# Patient Record
Sex: FEMALE | Race: WHITE | NOT HISPANIC OR LATINO | Employment: FULL TIME | URBAN - NONMETROPOLITAN AREA
[De-identification: names, ages, dates, MRNs, and addresses within clinical notes are randomized per-mention and may not be internally consistent; named-entity substitution may affect disease eponyms.]

---

## 2017-01-10 PROCEDURE — G0123 SCREEN CERV/VAG THIN LAYER: HCPCS | Performed by: OBSTETRICS & GYNECOLOGY

## 2017-01-11 ENCOUNTER — LAB REQUISITION (OUTPATIENT)
Dept: LAB | Facility: HOSPITAL | Age: 42
End: 2017-01-11

## 2017-01-11 DIAGNOSIS — Z12.72 ENCOUNTER FOR SCREENING FOR MALIGNANT NEOPLASM OF VAGINA: ICD-10-CM

## 2017-01-11 LAB
GEN CATEG CVX/VAG CYTO-IMP: NORMAL
LAB AP CASE REPORT: NORMAL
LAB AP GYN ADDITIONAL INFORMATION: NORMAL
LAB AP GYN OTHER FINDINGS: NORMAL
Lab: NORMAL
PATH INTERP SPEC-IMP: NORMAL
STAT OF ADQ CVX/VAG CYTO-IMP: NORMAL

## 2020-02-26 PROCEDURE — G0123 SCREEN CERV/VAG THIN LAYER: HCPCS | Performed by: OBSTETRICS & GYNECOLOGY

## 2020-02-27 ENCOUNTER — LAB REQUISITION (OUTPATIENT)
Dept: LAB | Facility: HOSPITAL | Age: 45
End: 2020-02-27

## 2020-02-27 DIAGNOSIS — Z12.72 ENCOUNTER FOR SCREENING FOR MALIGNANT NEOPLASM OF VAGINA: ICD-10-CM

## 2020-02-27 LAB
GEN CATEG CVX/VAG CYTO-IMP: NORMAL
LAB AP CASE REPORT: NORMAL
LAB AP GYN ADDITIONAL INFORMATION: NORMAL
PATH INTERP SPEC-IMP: NORMAL
STAT OF ADQ CVX/VAG CYTO-IMP: NORMAL

## 2020-09-21 ENCOUNTER — OFFICE VISIT (OUTPATIENT)
Dept: FAMILY MEDICINE CLINIC | Facility: CLINIC | Age: 45
End: 2020-09-21

## 2020-09-21 VITALS
SYSTOLIC BLOOD PRESSURE: 118 MMHG | WEIGHT: 170 LBS | RESPIRATION RATE: 16 BRPM | HEART RATE: 74 BPM | DIASTOLIC BLOOD PRESSURE: 72 MMHG | HEIGHT: 66 IN | OXYGEN SATURATION: 99 % | BODY MASS INDEX: 27.32 KG/M2 | TEMPERATURE: 97.8 F

## 2020-09-21 DIAGNOSIS — L23.7 ALLERGIC CONTACT DERMATITIS DUE TO PLANTS, EXCEPT FOOD: Primary | ICD-10-CM

## 2020-09-21 PROCEDURE — 96372 THER/PROPH/DIAG INJ SC/IM: CPT | Performed by: NURSE PRACTITIONER

## 2020-09-21 PROCEDURE — 99202 OFFICE O/P NEW SF 15 MIN: CPT | Performed by: NURSE PRACTITIONER

## 2020-09-21 RX ORDER — METHYLPREDNISOLONE 4 MG/1
TABLET ORAL
Qty: 1 EACH | Refills: 0 | Status: SHIPPED | OUTPATIENT
Start: 2020-09-21 | End: 2021-09-02

## 2020-09-21 RX ORDER — DEXAMETHASONE SODIUM PHOSPHATE 4 MG/ML
4 INJECTION, SOLUTION INTRA-ARTICULAR; INTRALESIONAL; INTRAMUSCULAR; INTRAVENOUS; SOFT TISSUE ONCE
Status: COMPLETED | OUTPATIENT
Start: 2020-09-21 | End: 2020-09-21

## 2020-09-21 RX ORDER — DIPHENHYDRAMINE HCL 25 MG
50 CAPSULE ORAL EVERY 4 HOURS PRN
COMMUNITY
End: 2021-09-02

## 2020-09-21 RX ORDER — TRIAMCINOLONE ACETONIDE 1 MG/G
CREAM TOPICAL 2 TIMES DAILY
Qty: 80 G | Refills: 0 | Status: SHIPPED | OUTPATIENT
Start: 2020-09-21 | End: 2021-09-02

## 2020-09-21 RX ADMIN — DEXAMETHASONE SODIUM PHOSPHATE 4 MG: 4 INJECTION, SOLUTION INTRA-ARTICULAR; INTRALESIONAL; INTRAMUSCULAR; INTRAVENOUS; SOFT TISSUE at 14:49

## 2020-09-21 NOTE — PROGRESS NOTES
Subjective   Chief Complaint:  Poison ivy-reestablish care    History of Present Illness:  This 45 y.o. female was seen in the office today.  She is reestablishing care today, past medical and surgical history taken.    Rash  This is a new problem. The current episode started in the past 7 days. The problem is unchanged. The affected locations include the neck, left upper leg and right upper leg. Rash characteristics: Itching. She was exposed to plant contact. Pertinent negatives include no congestion, cough, facial edema, fatigue, fever or shortness of breath. Past treatments include moisturizer. The treatment provided no relief.     No Known Allergies   Current Outpatient Medications on File Prior to Visit   Medication Sig   • diphenhydrAMINE (BENADRYL) 25 mg capsule Take 50 mg by mouth Every 4 (Four) Hours As Needed for Itching.   • [DISCONTINUED] acyclovir (ZOVIRAX) 5 % ointment APPLY FIVE TIMES A DAY GENERIC FOR ZOVIRAX     No current facility-administered medications on file prior to visit.       Past Medical, Surgical, Social, and Family History:  Past Medical History:   Diagnosis Date   • Cholelithiasis 2015    s/p GB surgery     Past Surgical History:   Procedure Laterality Date   • CHOLECYSTECTOMY  2015   • HYSTERECTOMY  2015     Social History     Socioeconomic History   • Marital status:      Spouse name: Not on file   • Number of children: Not on file   • Years of education: Not on file   • Highest education level: Not on file   Tobacco Use   • Smoking status: Never Smoker   • Smokeless tobacco: Former User   Substance and Sexual Activity   • Alcohol use: Never     Frequency: Never     Family History   Problem Relation Age of Onset   • Hypertension Mother    • Diabetes Father    • No Known Problems Sister      Review of Systems   Constitutional: Negative for appetite change, chills, fatigue and fever.   HENT: Negative for congestion.    Respiratory: Negative for cough and shortness of breath.   "  Cardiovascular: Negative for chest pain and palpitations.   Musculoskeletal: Negative for arthralgias and myalgias.   Skin: Positive for rash.     Objective   Physical Exam  Vitals signs and nursing note reviewed.   Constitutional:       General: She is not in acute distress.     Appearance: She is not diaphoretic.   HENT:      Head: Normocephalic and atraumatic.      Nose: Nose normal.   Eyes:      Conjunctiva/sclera: Conjunctivae normal.      Pupils: Pupils are equal, round, and reactive to light.   Neck:      Musculoskeletal: Normal range of motion and neck supple.      Thyroid: No thyromegaly.      Vascular: No JVD.      Trachea: No tracheal deviation.   Cardiovascular:      Rate and Rhythm: Normal rate and regular rhythm.      Heart sounds: Normal heart sounds. No murmur. No friction rub. No gallop.    Pulmonary:      Effort: Pulmonary effort is normal. No respiratory distress.      Breath sounds: Normal breath sounds. No wheezing.   Abdominal:      General: Bowel sounds are normal.      Palpations: Abdomen is soft.      Tenderness: There is no abdominal tenderness. There is no guarding.   Musculoskeletal: Normal range of motion.         General: No tenderness or deformity.   Lymphadenopathy:      Cervical: No cervical adenopathy.   Skin:     General: Skin is warm and dry.      Capillary Refill: Capillary refill takes less than 2 seconds.      Findings: Rash (neck - both lower legs) present.   Neurological:      Mental Status: She is alert and oriented to person, place, and time.   Psychiatric:         Behavior: Behavior normal.         Thought Content: Thought content normal.         Judgment: Judgment normal.     /72   Pulse 74   Temp 97.8 °F (36.6 °C) (Temporal)   Resp 16   Ht 167.6 cm (66\")   Wt 77.1 kg (170 lb)   SpO2 99%   BMI 27.44 kg/m²     Assessment/Plan   Diagnoses and all orders for this visit:    1. Allergic contact dermatitis due to plants, except food (Primary)  -     dexamethasone " (DECADRON) injection 4 mg  -     methylPREDNISolone (MEDROL) 4 MG dose pack; Take as directed on package instructions.  Dispense: 1 each; Refill: 0  -     triamcinolone (KENALOG) 0.1 % cream; Apply  topically to the appropriate area as directed 2 (Two) Times a Day.  Dispense: 80 g; Refill: 0    Discussion:  Advised and educated plan of care.  Decadron given in office today.  She will start a Dosepak tomorrow, topical steroids for itching relief.  Offered preventative care labs including lipid monitoring.  She declined today.    Follow-up:  Return if symptoms worsen or fail to improve.    Note e-Signed by TERESA Sin on 09/21/2020 at 14:34 CDT

## 2021-08-12 ENCOUNTER — TELEPHONE (OUTPATIENT)
Dept: FAMILY MEDICINE CLINIC | Facility: CLINIC | Age: 46
End: 2021-08-12

## 2021-08-12 RX ORDER — FLUCONAZOLE 150 MG/1
150 TABLET ORAL ONCE
Qty: 1 TABLET | Refills: 0 | Status: SHIPPED | OUTPATIENT
Start: 2021-08-12 | End: 2021-08-12

## 2021-08-12 NOTE — TELEPHONE ENCOUNTER
Caller: Krystal Farias    Relationship to patient: Self    Best call back number: [phone]     Patient is needing: PATIENTS GYNECOLOGIST CLOSED DOWN. PATIENT HAS YEAST INFECTION AND IS WANTING TO KNOW IF  WOULD CALL SOMETHING IN FOR IT.     CONFIRMED PHARMACY: [facility] - [place], [place] - [address] - [phone]  - [phone]   [phone]

## 2021-08-12 NOTE — TELEPHONE ENCOUNTER
Dr lopez..she does have new gyn but new appt is not until next month and they tory not call in meds

## 2021-09-02 ENCOUNTER — OFFICE VISIT (OUTPATIENT)
Dept: FAMILY MEDICINE CLINIC | Facility: CLINIC | Age: 46
End: 2021-09-02

## 2021-09-02 VITALS
HEIGHT: 66 IN | OXYGEN SATURATION: 96 % | SYSTOLIC BLOOD PRESSURE: 120 MMHG | BODY MASS INDEX: 25.55 KG/M2 | RESPIRATION RATE: 16 BRPM | WEIGHT: 159 LBS | DIASTOLIC BLOOD PRESSURE: 78 MMHG | HEART RATE: 71 BPM

## 2021-09-02 DIAGNOSIS — J01.91 ACUTE RECURRENT SINUSITIS, UNSPECIFIED LOCATION: ICD-10-CM

## 2021-09-02 DIAGNOSIS — J40 BRONCHITIS: Primary | ICD-10-CM

## 2021-09-02 DIAGNOSIS — Z20.822 SUSPECTED COVID-19 VIRUS INFECTION: ICD-10-CM

## 2021-09-02 PROCEDURE — 99213 OFFICE O/P EST LOW 20 MIN: CPT | Performed by: NURSE PRACTITIONER

## 2021-09-02 RX ORDER — LEVOFLOXACIN 500 MG/1
500 TABLET, FILM COATED ORAL DAILY
Qty: 10 TABLET | Refills: 0 | Status: SHIPPED | OUTPATIENT
Start: 2021-09-02 | End: 2021-09-12

## 2021-09-02 RX ORDER — TRIAMCINOLONE ACETONIDE 55 UG/1
2 SPRAY, METERED NASAL DAILY
Qty: 16.5 G | Refills: 1 | Status: SHIPPED | OUTPATIENT
Start: 2021-09-02 | End: 2022-04-16

## 2021-09-02 NOTE — PROGRESS NOTES
"Subjective   Chief Complaint:  Persistent cough and sinus problems    History of Present Illness:  This 46 y.o. female was seen in the office today.  She reports x2 weeks of cough congestion sinus problems.  Reports has been persistent despite using a round of amoxicillin given for dental issues.    No Known Allergies   No current outpatient medications on file prior to visit.     No current facility-administered medications on file prior to visit.      Past Medical, Surgical, Social, and Family History:  Past Medical History:   Diagnosis Date   • Cholelithiasis 2015    s/p GB surgery     Past Surgical History:   Procedure Laterality Date   • CHOLECYSTECTOMY  2015   • HYSTERECTOMY  2015     Social History     Socioeconomic History   • Marital status:      Spouse name: Not on file   • Number of children: Not on file   • Years of education: Not on file   • Highest education level: Not on file   Tobacco Use   • Smoking status: Never Smoker   • Smokeless tobacco: Former User   Substance and Sexual Activity   • Alcohol use: Never     Family History   Problem Relation Age of Onset   • Hypertension Mother    • Diabetes Father    • No Known Problems Sister      Objective   Physical Exam  Vitals reviewed.   Constitutional:       General: She is not in acute distress.     Appearance: Normal appearance.   HENT:      Nose: Congestion and rhinorrhea present.      Right Sinus: Maxillary sinus tenderness and frontal sinus tenderness present.      Left Sinus: Maxillary sinus tenderness and frontal sinus tenderness present.   Cardiovascular:      Rate and Rhythm: Normal rate and regular rhythm.   Pulmonary:      Effort: Pulmonary effort is normal. No respiratory distress.      Breath sounds: Rhonchi present. No wheezing.   Neurological:      Mental Status: She is alert.     /78   Pulse 71   Resp 16   Ht 167.6 cm (66\")   Wt 72.1 kg (159 lb)   SpO2 96%   BMI 25.66 kg/m²     Assessment/Plan   Diagnoses and all orders " for this visit:    1. Bronchitis (Primary)    2. Suspected COVID-19 virus infection    3. Acute recurrent sinusitis, unspecified location  -     COVID-19,[facility],NP/OP Swab in Transport Media or ESwab 72 HR TAT - Swab, Oropharynx  -     Triamcinolone Acetonide (Nasacort Allergy 24HR) 55 MCG/ACT nasal inhaler; 2 sprays into the nostril(s) as directed by provider Daily.  Dispense: 16.5 g; Refill: 1    Other orders  -     levoFLOXacin (Levaquin) 500 MG tablet; Take 1 tablet by mouth Daily for 10 days.  Dispense: 10 tablet; Refill: 0    Discussion:  Advised and educated plan of care.  We will get a confirmatory Covid test-I did verify with her she did get to negative rapid test.  We will add a nasal steroid, Levaquin to cover recurrent sinusitis along with the bronchitis or even pneumonia coverage if it moves further.  This is a step up from prior treatment.  Will let her know the results when available.  Advised to continue Mucinex.    Follow-up:  Return for As Needed - Depending on Test Results - Will Call.    Electronically signed by TERESA Sin, 09/02/21, 2:18 PM CDT.

## 2021-09-03 ENCOUNTER — TELEPHONE (OUTPATIENT)
Dept: FAMILY MEDICINE CLINIC | Facility: CLINIC | Age: 46
End: 2021-09-03

## 2021-09-03 NOTE — TELEPHONE ENCOUNTER
Krystal called back and said that she is having pain across the center of her back and her chest feels tight.   Please advise

## 2021-09-03 NOTE — TELEPHONE ENCOUNTER
"I notified the patient of Julio's instructions to go to the ER for evaluation.  She hesitated and asked if she could just get a cxr, I informed her that yes, we could do a cxr order, but if it showed pneumonia she was already on the strongest \"at home\" abx for pneumonia.  I told her that she would have to go to the ER anyway for possible iv abx.  She then asked if she could \"wait it out\" and I told her that it was up to her as to what she chose to do, but going to the ER, she could potentially get a chest CT and/or treatment right then instead of waiting.    I again urged her to go to the ER at  and she said that she would speak to her  and see what he wanted to do  "

## 2021-09-03 NOTE — TELEPHONE ENCOUNTER
Caller: Krystal Farias    Relationship: Self    Best call back number: [phone]    Who are you requesting to speak with (clinical staff, provider,  specific staff member): CLINICAL STAFF    What was the call regarding: PATIENT IS EXPERIENCING MID BACK PAIN AND WOULD LIKE TO DISCUSS THIS NEW SYMPTOM WITH CLINICAL STAFF

## 2021-09-04 ENCOUNTER — NURSE TRIAGE (OUTPATIENT)
Dept: CALL CENTER | Facility: HOSPITAL | Age: 46
End: 2021-09-04

## 2021-09-04 LAB
Lab: NORMAL
SARS-COV-2 RNA RESP QL NAA+PROBE: NOT DETECTED

## 2021-09-04 NOTE — TELEPHONE ENCOUNTER
"    Reason for Disposition  • General information question, no triage required and triager able to answer question    Additional Information  • Negative: [1] Caller is not with the adult (patient) AND [2] reporting urgent symptoms  • Negative: Lab result questions  • Negative: Medication questions  • Negative: Caller can't be reached by phone  • Negative: Caller has already spoken to PCP or another triager  • Negative: RN needs further essential information from caller in order to complete triage  • Negative: Requesting regular office appointment  • Negative: [1] Caller requesting NON-URGENT health information AND [2] PCP's office is the best resource  • Negative: Health Information question, no triage required and triager able to answer question    Answer Assessment - Initial Assessment Questions  1. REASON FOR CALL or QUESTION: \"What is your reason for calling today?\" or \"How can I best help you?\" or \"What question do you have that I can help answer?\"      Caller is requesting information on signing up for [facility].  Information given.    Protocols used: INFORMATION ONLY CALL - NO TRIAGE-ADULT-      "

## 2021-09-07 NOTE — PROGRESS NOTES
Please call the patient - Covid is negative    Electronically signed by TERESA Sin, 09/07/21, 7:57 AM CDT.

## 2021-09-07 NOTE — PROGRESS NOTES
Aside from hand washing and distancing, can have her change her toothbrush on the last day of ABT.  Otherwise sinus infections and bronchitis can occur.  Glad she is better.  Advise it is normal with bronchitis to continue to cough up mucous for a while and to not take any cough suppressants.  Better to get it up and out.  OTC mucinex can certainly be of help too.    Electronically signed by TERESA Sin, 09/07/21, 2:23 PM CDT.

## 2021-12-22 ENCOUNTER — TELEPHONE (OUTPATIENT)
Dept: FAMILY MEDICINE CLINIC | Facility: CLINIC | Age: 46
End: 2021-12-22

## 2021-12-22 NOTE — TELEPHONE ENCOUNTER
Caller: Krystal Farias    Relationship: Self    Best call back number: [phone]     What medication are you requesting: SOMETHING FOR POSSIBLE YEAST INFECTION    What are your current symptoms: SLIGHT ITCHINESS/IRRITATION    How long have you been experiencing symptoms: COUPLE OF DAYS     Have you had these symptoms before:    [x] Yes  [] No    Have you been treated for these symptoms before:   [x] Yes  [] No    If a prescription is needed, what is your preferred pharmacy and phone number: [facility] - [place], [place] - [address] [phone] [facility] [phone]      Additional notes: PATIENT STATED THAT SHE HAS HAD BEFORE AND WAS RECENTLY IN A HOT TUB. PLEASE ADVISE

## 2021-12-23 RX ORDER — FLUCONAZOLE 100 MG/1
100 TABLET ORAL DAILY
Qty: 3 TABLET | Refills: 0 | Status: SHIPPED | OUTPATIENT
Start: 2021-12-23 | End: 2021-12-26

## 2022-01-04 ENCOUNTER — OFFICE VISIT (OUTPATIENT)
Dept: FAMILY MEDICINE CLINIC | Facility: CLINIC | Age: 47
End: 2022-01-04

## 2022-01-04 VITALS
TEMPERATURE: 98.5 F | BODY MASS INDEX: 25.55 KG/M2 | HEART RATE: 66 BPM | WEIGHT: 159 LBS | HEIGHT: 66 IN | RESPIRATION RATE: 16 BRPM | OXYGEN SATURATION: 96 %

## 2022-01-04 DIAGNOSIS — J02.9 SORE THROAT: Primary | ICD-10-CM

## 2022-01-04 DIAGNOSIS — J02.0 STREP PHARYNGITIS: ICD-10-CM

## 2022-01-04 LAB
EXPIRATION DATE: ABNORMAL
INTERNAL CONTROL: ABNORMAL
Lab: ABNORMAL
S PYO AG THROAT QL: POSITIVE

## 2022-01-04 PROCEDURE — 99213 OFFICE O/P EST LOW 20 MIN: CPT | Performed by: NURSE PRACTITIONER

## 2022-01-04 PROCEDURE — 87880 STREP A ASSAY W/OPTIC: CPT | Performed by: NURSE PRACTITIONER

## 2022-01-04 RX ORDER — AZITHROMYCIN 250 MG/1
TABLET, FILM COATED ORAL
Qty: 6 TABLET | Refills: 0 | Status: SHIPPED | OUTPATIENT
Start: 2022-01-04 | End: 2022-01-09

## 2022-01-04 NOTE — PROGRESS NOTES
"Subjective   Chief Complaint:  Evaluation of Covid Symptoms    History of Present Illness:  This 46 y.o. female was seen in the office today in the drive through.  Reports has been experiencing sore throat, hoarseness, congestion, bilateral earache with an acute onset starting Saturday.    Denies having any cough, fatigue, loss of taste or smell.    Denies recent exposure to active Covid-19 cases.          Covid-19 Vaccination Status:  Reports unvaccinated to Covid or influenza    No Known Allergies   Current Outpatient Medications on File Prior to Visit   Medication Sig   • Triamcinolone Acetonide (Nasacort Allergy 24HR) 55 MCG/ACT nasal inhaler 2 sprays into the nostril(s) as directed by provider Daily.     No current facility-administered medications on file prior to visit.      Past Medical, Surgical, Social, and Family History:  Past Medical History:   Diagnosis Date   • Cholelithiasis 2015    s/p GB surgery     Past Surgical History:   Procedure Laterality Date   • CHOLECYSTECTOMY  2015   • HYSTERECTOMY  2015     Social History     Socioeconomic History   • Marital status:    Tobacco Use   • Smoking status: Never Smoker   • Smokeless tobacco: Former User   Substance and Sexual Activity   • Alcohol use: Never     Family History   Problem Relation Age of Onset   • Hypertension Mother    • Diabetes Father    • No Known Problems Sister      Objective   Covid Precautions Maintained  Physical Exam  Constitutional:       General: She is not in acute distress.     Appearance: Normal appearance. She is not ill-appearing.   HENT:      Mouth/Throat:      Pharynx: Oropharyngeal exudate and posterior oropharyngeal erythema present. No uvula swelling.   Pulmonary:      Effort: Pulmonary effort is normal. No respiratory distress.   Neurological:      Mental Status: She is alert.     Pulse 66   Temp 98.5 °F (36.9 °C)   Resp 16   Ht 167.6 cm (66\")   Wt 72.1 kg (159 lb)   SpO2 96%   BMI 25.66 kg/m² "     Assessment/Plan   Diagnoses and all orders for this visit:    1. Sore throat (Primary)  -     POC Rapid Strep A    2. Strep pharyngitis    Other orders  -     azithromycin (Zithromax Z-Sukhwinder) 250 MG tablet; Take 2 tablets the first day, then 1 tablet daily for 4 days.  Dispense: 6 tablet; Refill: 0    Discussion:  Advised and educated plan of care.  Point-of-care strep test positive, antibiotics to follow.    Follow-up:  Return if symptoms worsen or fail to improve.    Electronically signed by TERESA Sin, 01/04/22, 3:35 PM CST.

## 2022-01-09 ENCOUNTER — TELEPHONE (OUTPATIENT)
Dept: FAMILY MEDICINE CLINIC | Facility: CLINIC | Age: 47
End: 2022-01-09

## 2022-01-09 NOTE — TELEPHONE ENCOUNTER
ON CALL 1.6.22  Day 6 of z pack  Still has sore throat; no sob, no dysphagia    Told to salt water gargle/ER if worsens  At least no change till Monday/early next week when office opens

## 2022-04-18 ENCOUNTER — OFFICE VISIT (OUTPATIENT)
Dept: FAMILY MEDICINE CLINIC | Facility: CLINIC | Age: 47
End: 2022-04-18

## 2022-04-18 VITALS
WEIGHT: 181 LBS | BODY MASS INDEX: 28.41 KG/M2 | HEIGHT: 67 IN | TEMPERATURE: 97.3 F | HEART RATE: 84 BPM | DIASTOLIC BLOOD PRESSURE: 88 MMHG | SYSTOLIC BLOOD PRESSURE: 146 MMHG | RESPIRATION RATE: 16 BRPM | OXYGEN SATURATION: 96 %

## 2022-04-18 DIAGNOSIS — J02.0 STREP PHARYNGITIS: Primary | ICD-10-CM

## 2022-04-18 DIAGNOSIS — J02.9 SORE THROAT: ICD-10-CM

## 2022-04-18 LAB
EXPIRATION DATE: ABNORMAL
INTERNAL CONTROL: ABNORMAL
Lab: ABNORMAL
S PYO AG THROAT QL: POSITIVE

## 2022-04-18 PROCEDURE — 96373 THER/PROPH/DIAG INJ IA: CPT | Performed by: NURSE PRACTITIONER

## 2022-04-18 PROCEDURE — 87880 STREP A ASSAY W/OPTIC: CPT | Performed by: NURSE PRACTITIONER

## 2022-04-18 PROCEDURE — 99213 OFFICE O/P EST LOW 20 MIN: CPT | Performed by: NURSE PRACTITIONER

## 2022-04-18 RX ORDER — DEXAMETHASONE SODIUM PHOSPHATE 4 MG/ML
10 INJECTION, SOLUTION INTRA-ARTICULAR; INTRALESIONAL; INTRAMUSCULAR; INTRAVENOUS; SOFT TISSUE ONCE
Status: COMPLETED | OUTPATIENT
Start: 2022-04-18 | End: 2022-04-18

## 2022-04-18 RX ORDER — CEFTRIAXONE 1 G/1
1 INJECTION, POWDER, FOR SOLUTION INTRAMUSCULAR; INTRAVENOUS ONCE
Status: COMPLETED | OUTPATIENT
Start: 2022-04-18 | End: 2022-04-18

## 2022-04-18 RX ADMIN — DEXAMETHASONE SODIUM PHOSPHATE 10 MG: 4 INJECTION, SOLUTION INTRA-ARTICULAR; INTRALESIONAL; INTRAMUSCULAR; INTRAVENOUS; SOFT TISSUE at 13:22

## 2022-04-18 RX ADMIN — CEFTRIAXONE 1 G: 1 INJECTION, POWDER, FOR SOLUTION INTRAMUSCULAR; INTRAVENOUS at 11:30

## 2022-04-18 NOTE — PROGRESS NOTES
Subjective   Chief Complaint:  Reevaluation of sore throat    History of Present Illness:  This 46 y.o. female was seen in the office today.  She was seen in urgent care and is on day 2 of a Z-Sukhwinder.  Reports throat still hurting.  Reports still having fevers.  Reports taking ibuprofen is much is 2400 mg/day taj doses.    Allergies   Allergen Reactions   • Penicillins Unknown - Low Severity     Not sure if allergic to PCN, Mother reported from childhood.      Current Outpatient Medications on File Prior to Visit   Medication Sig   • cetirizine (zyrTEC) 10 MG tablet Take 10 mg by mouth Daily.   • azithromycin (Zithromax Z-Sukhwinder) 250 MG tablet Take 2 tablets the first day, then 1 tablet daily for 4 days.     No current facility-administered medications on file prior to visit.      Past Medical, Surgical, Social, and Family History:  Past Medical History:   Diagnosis Date   • Cholelithiasis 2015    s/p GB surgery     Past Surgical History:   Procedure Laterality Date   • CHOLECYSTECTOMY  2015   • HYSTERECTOMY  2015     Social History     Socioeconomic History   • Marital status:    Tobacco Use   • Smoking status: Never Smoker   • Smokeless tobacco: Former User   Substance and Sexual Activity   • Alcohol use: Never   • Drug use: Never     Family History   Problem Relation Age of Onset   • Hypertension Mother    • Diabetes Father    • No Known Problems Sister      Objective   Physical Exam  Vitals reviewed.   Constitutional:       General: She is not in acute distress.     Appearance: Normal appearance.   HENT:      Mouth/Throat:      Pharynx: Oropharyngeal exudate and posterior oropharyngeal erythema present.      Tonsils: Tonsillar exudate present. 3+ on the right. 3+ on the left.   Cardiovascular:      Rate and Rhythm: Normal rate and regular rhythm.   Pulmonary:      Effort: Pulmonary effort is normal.      Breath sounds: Normal breath sounds.     /88 (BP Location: Left arm)   Pulse 84   Temp 97.3 °F  "(36.3 °C)   Resp 16   Ht 170.2 cm (67\")   Wt 82.1 kg (181 lb)   SpO2 96%   BMI 28.35 kg/m²     Assessment/Plan   Diagnoses and all orders for this visit:    1. Strep pharyngitis (Primary)  -     cefTRIAXone (ROCEPHIN) injection 1 g  -     dexamethasone (DECADRON) injection 10 mg    Discussion:  Advised and educated plan of care.  With allergies to penicillins-she is on track with the [facility] to finish this-will give booster antibiotic and steroid injection today.  Advised salt water gargles.    Follow-up:  No follow-ups on file.    Electronically signed by TERESA Sin, 04/18/22, 11:26 AM CDT.  "

## 2022-04-19 ENCOUNTER — TELEPHONE (OUTPATIENT)
Dept: FAMILY MEDICINE CLINIC | Facility: CLINIC | Age: 47
End: 2022-04-19

## 2022-04-19 NOTE — LETTER
TERESA Armijo  [address] [address]  Phone: [phone]  Fax: [phone]      PATIENT NAME: Krystal Farias                                                                          YOB: [birth year]    To whom it may concern,    Krystal Farias should be excused from work 4/18/2022 - 4/20/2022.    Electronically signed by TERESA Sin, 04/19/22, 1:19 PM CDT.

## 2022-04-19 NOTE — TELEPHONE ENCOUNTER
I have generated a note - this is ok to be off.  Her work shouldn't be asking for diagnosis.  This is PHI and will not be included in the note.  She has the positive result on [facility] if they push her to provide this and it is her prerogative to tell them but again, they shouldn't be asking this.      Electronically signed by TERESA Sin, 04/19/22, 1:17 PM CDT.

## 2022-04-19 NOTE — TELEPHONE ENCOUNTER
Caller: Krystal Farias    Relationship: Self    Best call back number: [phone]        What was the call regarding: PATIENT STATES SHE WAS SEEN IN THE OFFICE YESTERDAY AND TESTED POSITIVE FOR STREP THROAT. SHE STATES HER THROAT IS STILL HURTING AND WANTING TO KNOW IF THE OFFICE CAN FAX OVER A WORK EXCUSE FOR 4/18/22-4/20/22. SHE STATES THE EXCUSE NEEDS TO SAY SHE TESTED POSITIVE FOR STREP. PLEASE ADVISE    FAX TO     [facility]  FAX # [phone]    Do you require a callback: YES

## 2022-04-20 ENCOUNTER — TELEPHONE (OUTPATIENT)
Dept: FAMILY MEDICINE CLINIC | Facility: CLINIC | Age: 47
End: 2022-04-20

## 2022-04-20 RX ORDER — CEFDINIR 300 MG/1
300 CAPSULE ORAL 2 TIMES DAILY
Qty: 14 CAPSULE | Refills: 0 | Status: SHIPPED | OUTPATIENT
Start: 2022-04-20 | End: 2022-04-27

## 2022-04-20 NOTE — TELEPHONE ENCOUNTER
I would suggest a 7 day course of omnicef to finish this off.  The z-pack doesn't always irradicate strep as well as it used to.   Irradication rates for cephalosporins seem superior in most studies.  If needs an extra day or 2 ok, but also since had z-pack and improving, ok to go back.    Electronically signed by TERESA Sin, 04/20/22, 2:58 PM CDT.

## 2022-04-20 NOTE — TELEPHONE ENCOUNTER
Caller: Krystal Farias    Relationship: Self    Best call back number: [phone]    What is the best time to reach you: ANYTIME     Who are you requesting to speak with (clinical staff, provider,  specific staff member): CLINICAL     What was the call regarding: NEEDING TO KNOW IF SHE NEEDS TO RETURN TO WORK WITH STILL HAVING A SORE THROAT AND THE SWOLLEN LYMPH NOTE ON THE RIGHT SIDE    Do you require a callback: YES

## 2022-04-25 ENCOUNTER — TELEPHONE (OUTPATIENT)
Dept: FAMILY MEDICINE CLINIC | Facility: CLINIC | Age: 47
End: 2022-04-25

## 2022-04-25 RX ORDER — FLUCONAZOLE 150 MG/1
150 TABLET ORAL ONCE
Qty: 1 TABLET | Refills: 0 | Status: SHIPPED | OUTPATIENT
Start: 2022-04-25 | End: 2022-04-25

## 2022-04-25 NOTE — TELEPHONE ENCOUNTER
Caller: Krystal Farias    Relationship: Self    Best call back number: [phone]        What was the call regarding: PATIENT STATES A FEW ANTIBIOTICS HAVE RECENTLY BEEN GIVEN TO TREAT STREP THROAT. PATIENT STATES SHE IS NOW ITCHING IN HER GENITALS. PATIENT WOULD LIKE TO KNOW IF SOMETHING CAN BE CALLED IN TO TREAT THE ITCHING. PLEASE ADVISE    Do you require a callback: YES

## 2022-06-30 ENCOUNTER — TELEPHONE (OUTPATIENT)
Dept: FAMILY MEDICINE CLINIC | Facility: CLINIC | Age: 47
End: 2022-06-30

## 2022-06-30 DIAGNOSIS — G89.29 CHRONIC HEEL PAIN, UNSPECIFIED LATERALITY: Primary | ICD-10-CM

## 2022-06-30 DIAGNOSIS — M79.673 CHRONIC HEEL PAIN, UNSPECIFIED LATERALITY: Primary | ICD-10-CM

## 2022-06-30 NOTE — TELEPHONE ENCOUNTER
Caller: Krystal Farias    Relationship: Self    Best call back number: [phone]    What is the medical concern/diagnosis: HEEL PAIN, SWOLLEN ANKLE    What specialty or service is being requested: REFERRAL    What is the provider, practice or medical service name: DR. WORLEY    What is the office location:  [address] [place] [address]    What is the office phone number:  [phone]    Any additional details: PATIENT STATES SHE CAN HARDLY WALK ON HER FOOT DUE TO EXTREME HEEL PAIN/SWOLLEN ANKLE AND SHE IS REQUESTING A REFERRAL TO .

## 2023-02-02 ENCOUNTER — OFFICE VISIT (OUTPATIENT)
Dept: FAMILY MEDICINE CLINIC | Facility: CLINIC | Age: 48
End: 2023-02-02
Payer: COMMERCIAL

## 2023-02-02 VITALS
OXYGEN SATURATION: 97 % | DIASTOLIC BLOOD PRESSURE: 76 MMHG | SYSTOLIC BLOOD PRESSURE: 140 MMHG | WEIGHT: 179 LBS | HEIGHT: 67 IN | BODY MASS INDEX: 28.09 KG/M2 | HEART RATE: 74 BPM

## 2023-02-02 DIAGNOSIS — J02.9 SORE THROAT: ICD-10-CM

## 2023-02-02 DIAGNOSIS — J03.90 TONSILLITIS: Primary | ICD-10-CM

## 2023-02-02 DIAGNOSIS — R05.9 COUGH, UNSPECIFIED TYPE: ICD-10-CM

## 2023-02-02 LAB
EXPIRATION DATE: NORMAL
EXPIRATION DATE: NORMAL
INTERNAL CONTROL: NORMAL
INTERNAL CONTROL: NORMAL
Lab: NORMAL
Lab: NORMAL
S PYO AG THROAT QL: NEGATIVE
SARS-COV-2 AG UPPER RESP QL IA.RAPID: NOT DETECTED

## 2023-02-02 PROCEDURE — 99213 OFFICE O/P EST LOW 20 MIN: CPT | Performed by: FAMILY MEDICINE

## 2023-02-02 PROCEDURE — 87426 SARSCOV CORONAVIRUS AG IA: CPT | Performed by: FAMILY MEDICINE

## 2023-02-02 PROCEDURE — 87880 STREP A ASSAY W/OPTIC: CPT | Performed by: FAMILY MEDICINE

## 2023-02-02 RX ORDER — CLINDAMYCIN HYDROCHLORIDE 150 MG/1
150 CAPSULE ORAL 3 TIMES DAILY
Qty: 21 CAPSULE | Refills: 0 | Status: SHIPPED | OUTPATIENT
Start: 2023-02-02

## 2023-02-02 NOTE — PROGRESS NOTES
"Subjective   Krystal Farias is a 47 y.o. female.     Chief Complaint   Patient presents with   • Sore Throat   • Cough        History of Present Illness     she notes sinus congestion with pressure and sore throat [facility] csough prod green sputusm--denies fver or myalgis      Current Outpatient Medications:   •  cetirizine (zyrTEC) 10 MG tablet, Take 10 mg by mouth Daily., Disp: , Rfl:   •  azithromycin (Zithromax Z-Sukhwinder) 250 MG tablet, Take 2 tablets the first day, then 1 tablet daily for 4 days., Disp: 6 tablet, Rfl: 0  •  clindamycin (Cleocin) 150 MG capsule, Take 1 capsule by mouth 3 (Three) Times a Day., Disp: 21 capsule, Rfl: 0  Allergies   Allergen Reactions   • Penicillins Unknown - Low Severity     Not sure if allergic to PCN, Mother reported from childhood.       BMI is >= 25 and <30. (Overweight) The following options were offered after discussion;: nutrition counseling/recommendations      Past Medical History:   Diagnosis Date   • Cholelithiasis 2015    s/p GB surgery     Past Surgical History:   Procedure Laterality Date   • CHOLECYSTECTOMY  2015   • HYSTERECTOMY  2015       Review of Systems   Constitutional: Negative.    HENT: Positive for congestion, rhinorrhea, sinus pain and sore throat.    Eyes: Negative.    Respiratory: Positive for cough.    Cardiovascular: Negative.    Gastrointestinal: Negative.    Endocrine: Negative.    Genitourinary: Negative.    Musculoskeletal: Negative.    Skin: Negative.    Allergic/Immunologic: Negative.    Neurological: Negative.    Hematological: Negative.    Psychiatric/Behavioral: Negative.        Objective  /76   Pulse 74   Ht 170.2 cm (67\")   Wt 81.2 kg (179 lb)   SpO2 97%   BMI 28.04 kg/m²   Physical Exam  Vitals and nursing note reviewed.   Constitutional:       Appearance: Normal appearance. She is normal weight.   HENT:      Head: Normocephalic and atraumatic.      Nose: Congestion present.      Mouth/Throat:      Mouth: Mucous membranes are dry.     "  Pharynx: Oropharynx is clear. Posterior oropharyngeal erythema present.   Eyes:      Pupils: Pupils are equal, round, and reactive to light.   Cardiovascular:      Rate and Rhythm: Normal rate and regular rhythm.      Pulses: Normal pulses.      Heart sounds: Normal heart sounds.   Pulmonary:      Effort: Pulmonary effort is normal.      Breath sounds: Normal breath sounds.   Abdominal:      General: Abdomen is flat. Bowel sounds are normal.      Palpations: Abdomen is soft.   Musculoskeletal:         General: Normal range of motion.      Cervical back: Normal range of motion and neck supple.   Skin:     General: Skin is warm and dry.      Capillary Refill: Capillary refill takes less than 2 seconds.   Neurological:      General: No focal deficit present.      Mental Status: She is alert and oriented to person, place, and time. Mental status is at baseline.   Psychiatric:         Mood and Affect: Mood normal.         Assessment & Plan   Diagnoses and all orders for this visit:    1. Tonsillitis (Primary)  -     POCT VERITOR SARS-CoV-2 Antigen  -     POCT rapid strep A    2. Sore throat  -     POCT VERITOR SARS-CoV-2 Antigen  -     POCT rapid strep A    3. Cough, unspecified type  -     POCT VERITOR SARS-CoV-2 Antigen  -     POCT rapid strep A    Other orders  -     clindamycin (Cleocin) 150 MG capsule; Take 1 capsule by mouth 3 (Three) Times a Day.  Dispense: 21 capsule; Refill: 0      Keep me informed           Orders Placed This Encounter   Procedures   • POCT VERITOR SARS-CoV-2 Antigen     Order Specific Question:   Release to patient     Answer:   Routine Release   • POCT rapid strep A     Order Specific Question:   Release to patient     Answer:   Routine Release       Follow up:prn

## 2023-09-20 ENCOUNTER — TELEPHONE (OUTPATIENT)
Dept: FAMILY MEDICINE CLINIC | Facility: CLINIC | Age: 48
End: 2023-09-20

## 2023-09-20 RX ORDER — METHYLPREDNISOLONE 4 MG/1
TABLET ORAL
Qty: 21 TABLET | Refills: 0 | Status: SHIPPED | OUTPATIENT
Start: 2023-09-20

## 2023-09-20 NOTE — TELEPHONE ENCOUNTER
Caller: Krystal Farias    Relationship: Self    Best call back number:  [phone]     What medication are you requesting: STEROID PACK    What are your current symptoms: WENT TO DR. THOMAS, CHIROPRACTOR IN [facility], HE SUGGESTED THAT PATIENT GETS A STEROID PACK FOR HER SCIATIC NERVE.  IT IS IN FLAMED.  TOO CALM DOWN, MAY HELP?     How long have you been experiencing symptoms: 30 DAYS    Have you had these symptoms before:    [x] Yes  [] No    Have you been treated for these symptoms before:   [] Yes  [x] No    If a prescription is needed, what is your preferred pharmacy and phone number:      Additional notes:

## 2023-10-26 ENCOUNTER — OFFICE VISIT (OUTPATIENT)
Dept: FAMILY MEDICINE CLINIC | Facility: CLINIC | Age: 48
End: 2023-10-26
Payer: COMMERCIAL

## 2023-10-26 VITALS
DIASTOLIC BLOOD PRESSURE: 74 MMHG | HEART RATE: 74 BPM | SYSTOLIC BLOOD PRESSURE: 132 MMHG | WEIGHT: 184 LBS | HEIGHT: 67 IN | OXYGEN SATURATION: 98 % | BODY MASS INDEX: 28.88 KG/M2

## 2023-10-26 DIAGNOSIS — M54.41 CHRONIC MIDLINE LOW BACK PAIN WITH RIGHT-SIDED SCIATICA: Primary | ICD-10-CM

## 2023-10-26 DIAGNOSIS — G89.29 CHRONIC MIDLINE LOW BACK PAIN WITH RIGHT-SIDED SCIATICA: Primary | ICD-10-CM

## 2023-10-26 PROCEDURE — 99213 OFFICE O/P EST LOW 20 MIN: CPT | Performed by: FAMILY MEDICINE

## 2023-10-26 RX ORDER — MELOXICAM 15 MG/1
15 TABLET ORAL DAILY
COMMUNITY
Start: 2023-07-13

## 2023-10-26 NOTE — PROGRESS NOTES
"Subjective   Krystal Farias is a 48 y.o. female.     Chief Complaint   Patient presents with    Back Pain     Low back         Back Pain         She has been going to chiropactor dr aguirre for 2mos for lower back pain--she notes having lower back down the right leg--she has tried steroids and chiropactic care without improvement  and nsaids       Current Outpatient Medications:     cetirizine (zyrTEC) 10 MG tablet, Take 1 tablet by mouth Daily., Disp: , Rfl:     meloxicam (MOBIC) 15 MG tablet, Take 1 tablet by mouth Daily., Disp: , Rfl:   Allergies   Allergen Reactions    Penicillins Unknown - Low Severity     Not sure if allergic to PCN, Mother reported from childhood.              Facility age limit for growth %sharon is 20 years.    Past Medical History:   Diagnosis Date    Cholelithiasis 2015    s/p GB surgery     Past Surgical History:   Procedure Laterality Date    CHOLECYSTECTOMY  2015    HYSTERECTOMY  2015       Review of Systems   Constitutional: Negative.    HENT: Negative.     Eyes: Negative.    Respiratory: Negative.     Cardiovascular: Negative.    Gastrointestinal: Negative.    Endocrine: Negative.    Genitourinary: Negative.    Musculoskeletal:  Positive for back pain.   Skin: Negative.    Allergic/Immunologic: Negative.    Neurological: Negative.    Hematological: Negative.    Psychiatric/Behavioral: Negative.         Objective /74   Pulse 74   Ht 170.2 cm (67\")   Wt 83.5 kg (184 lb)   SpO2 98%   BMI 28.82 kg/m²    Physical Exam  Vitals and nursing note reviewed.   Constitutional:       Appearance: Normal appearance. She is normal weight.   HENT:      Head: Normocephalic and atraumatic.      Nose: Nose normal.      Mouth/Throat:      Mouth: Mucous membranes are moist.   Eyes:      Pupils: Pupils are equal, round, and reactive to light.   Cardiovascular:      Rate and Rhythm: Normal rate and regular rhythm.      Pulses: Normal pulses.      Heart sounds: Normal heart sounds.   Pulmonary:      " Effort: Pulmonary effort is normal.   Abdominal:      General: Abdomen is flat.   Musculoskeletal:         General: Normal range of motion.      Cervical back: Normal range of motion and neck supple.   Skin:     General: Skin is warm and dry.      Capillary Refill: Capillary refill takes less than 2 seconds.   Neurological:      General: No focal deficit present.      Mental Status: She is alert and oriented to person, place, and time. Mental status is at baseline.   Psychiatric:         Mood and Affect: Mood normal.         Assessment & Plan   Diagnoses and all orders for this visit:    1. Chronic midline low back pain with right-sided sciatica (Primary)  -     XR Spine Lumbar 4+ View    Call for results                Orders Placed This Encounter   Procedures    XR Spine Lumbar 4+ View     Order Specific Question:   Reason for Exam:     Answer:   low back pain     Order Specific Question:   Patient Pregnant     Answer:   No     Order Specific Question:   Does this patient have a diabetic monitoring/medication delivering device on?     Answer:   No     Order Specific Question:   Release to patient     Answer:   Routine Release [[id number]]       Follow up: 2 month(s)

## 2023-10-30 ENCOUNTER — HOSPITAL ENCOUNTER (OUTPATIENT)
Dept: GENERAL RADIOLOGY | Facility: HOSPITAL | Age: 48
Discharge: HOME | End: 2023-10-30
Admitting: FAMILY MEDICINE
Payer: COMMERCIAL

## 2023-10-30 DIAGNOSIS — G89.29 CHRONIC MIDLINE LOW BACK PAIN WITH RIGHT-SIDED SCIATICA: Primary | ICD-10-CM

## 2023-10-30 DIAGNOSIS — M54.41 CHRONIC MIDLINE LOW BACK PAIN WITH RIGHT-SIDED SCIATICA: Primary | ICD-10-CM

## 2023-10-30 PROCEDURE — 72110 X-RAY EXAM L-2 SPINE 4/>VWS: CPT

## 2023-11-07 ENCOUNTER — TELEPHONE (OUTPATIENT)
Dept: FAMILY MEDICINE CLINIC | Facility: CLINIC | Age: 48
End: 2023-11-07

## 2023-11-07 NOTE — TELEPHONE ENCOUNTER
Caller: Krystal Farias    Relationship: Self    Best call back number: [phone]     What is the best time to reach you: ANY    Who are you requesting to speak with (clinical staff, provider,  specific staff member): CLINICAL    Do you know the name of the person who called: SELF    What was the call regarding: WANTING TO CHECK STATUS OF GETTING THE MRI SCHEDULE AT [facility]. PATIENT JUST HASN'T HEARD ANYTHING     Is it okay if the provider responds through [employer] [facility]: YES OR CALL BACK

## 2023-11-07 NOTE — TELEPHONE ENCOUNTER
MRI request has been submitted to insurance. No response yet. Attempted to reach patient. Left vm

## 2023-11-14 ENCOUNTER — TELEPHONE (OUTPATIENT)
Dept: FAMILY MEDICINE CLINIC | Facility: CLINIC | Age: 48
End: 2023-11-14
Payer: COMMERCIAL

## 2023-11-14 DIAGNOSIS — G89.29 CHRONIC MIDLINE LOW BACK PAIN WITH RIGHT-SIDED SCIATICA: Primary | ICD-10-CM

## 2023-11-14 DIAGNOSIS — M54.41 CHRONIC MIDLINE LOW BACK PAIN WITH RIGHT-SIDED SCIATICA: Primary | ICD-10-CM

## 2023-11-14 NOTE — TELEPHONE ENCOUNTER
Let her know her insurance discounted her 2mos of treatment with dr aguirre and steroids for her back pain and declines the mri we ordered--can we go ahead and order consult with neurosurgery?

## 2023-11-22 ENCOUNTER — OFFICE VISIT (OUTPATIENT)
Dept: NEUROSURGERY | Facility: CLINIC | Age: 48
End: 2023-11-22
Payer: COMMERCIAL

## 2023-11-22 VITALS — WEIGHT: 184.6 LBS | BODY MASS INDEX: 29.67 KG/M2 | HEIGHT: 66 IN

## 2023-11-22 DIAGNOSIS — M54.16 LUMBAR RADICULOPATHY: ICD-10-CM

## 2023-11-22 DIAGNOSIS — M51.36 DDD (DEGENERATIVE DISC DISEASE), LUMBAR: Primary | ICD-10-CM

## 2023-11-22 DIAGNOSIS — Z78.9 NONSMOKER: ICD-10-CM

## 2023-11-22 RX ORDER — MELOXICAM 15 MG/1
15 TABLET ORAL DAILY
Qty: 30 TABLET | Refills: 2 | Status: SHIPPED | OUTPATIENT
Start: 2023-11-22

## 2023-11-22 RX ORDER — DIAZEPAM 5 MG/1
TABLET ORAL
Qty: 2 TABLET | Refills: 0 | Status: SHIPPED | OUTPATIENT
Start: 2023-11-22

## 2023-11-22 RX ORDER — GABAPENTIN 100 MG/1
CAPSULE ORAL
Qty: 90 CAPSULE | Refills: 1 | Status: SHIPPED | OUTPATIENT
Start: 2023-11-22

## 2023-11-22 NOTE — PROGRESS NOTES
Chief complaint:   Chief Complaint   Patient presents with    Back Pain     Chronic midline low back pain with right-sided sciatica-X-RAY LUMBAR 10/30/23 [facility]- pt states has gone to the chiropractor had for 2 months, had a deep tissue laser pt is unsure of what it was. But nothing seemed to help       Subjective     HPI: Krystal presents as a consultation from Dr. Patel's office with a 4-month history of low back pain with radiation into the right buttock and into the posterior thigh.  She also intermittently experiences burning pain in the right medial ankle.  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  Her pain is not too bad in the morning but worsens as the day progresses and is most intense in the evening.  She has quite a bit of pain with sitting, not so much with standing.  She is most comfortable lying down.  She denies any focal weakness as well as paresthesias.  She has tried Medrol Dosepak as well as meloxicam and chiropractic care without symptom relief.  She was in chiropractic care 3 times a week for 8 weeks including traction, adjustments, ultrasound and e-stim without symptom improvement.  She did purchase a home TENS unit for personal use.    Review of Systems     Past Medical History:   Diagnosis Date    Arthritis On x ray 2 years ago    Cholelithiasis 2015    s/p GB surgery    Lumbar radiculopathy 11/22/2023     Past Surgical History:   Procedure Laterality Date    CHOLECYSTECTOMY  2015    HYSTERECTOMY  2015     Family History   Problem Relation Age of Onset    Hypertension Mother     Diabetes Father     No Known Problems Sister      Social History     Tobacco Use    Smoking status: Never    Smokeless tobacco: Former   Substance Use Topics    Alcohol use: Never    Drug use: Never     (Not in a hospital admission)    Allergies:  Penicillins    Objective      Vital Signs  Ht 167.6  "cm (66\")   Wt 83.7 kg (184 lb 9.6 oz)   BMI 29.80 kg/m²     Physical Exam  Constitutional:       Appearance: Normal appearance. She is well-developed.   HENT:      Head: Normocephalic.   Eyes:      General: Lids are normal.      Conjunctiva/sclera: Conjunctivae normal.      Pupils: Pupils are equal, round, and reactive to light.   Pulmonary:      Effort: Pulmonary effort is normal.      Breath sounds: Normal breath sounds.   Musculoskeletal:         General: No tenderness (SI joints are nontender.). Normal range of motion.      Cervical back: Normal range of motion.   Skin:     General: Skin is warm.   Neurological:      Mental Status: She is alert and oriented to person, place, and time.      GCS: GCS eye subscore is 4. GCS verbal subscore is 5. GCS motor subscore is 6.      Cranial Nerves: Cranial nerves 2-12 are intact. No cranial nerve deficit.      Sensory: No sensory deficit.      Motor: Motor strength is normal.     Gait: Gait is intact.      Deep Tendon Reflexes: Reflexes are normal and symmetric. Reflexes normal.      Reflex Scores:       Tricep reflexes are 2+ on the right side and 2+ on the left side.       Bicep reflexes are 2+ on the right side and 2+ on the left side.       Brachioradialis reflexes are 2+ on the right side and 2+ on the left side.       Patellar reflexes are 2+ on the right side and 2+ on the left side.       Achilles reflexes are 2+ on the right side and 2+ on the left side.  Psychiatric:         Speech: Speech normal.         Behavior: Behavior normal.         Thought Content: Thought content normal.         Neurologic Exam     Mental Status   Oriented to person, place, and time.   Speech: speech is normal   Level of consciousness: alert  Knowledge: good.     Cranial Nerves   Cranial nerves II through XII intact.     CN III, IV, VI   Pupils are equal, round, and reactive to light.    Motor Exam   Muscle bulk: normal  Overall muscle tone: normal    Strength   Strength 5/5 " throughout.     Sensory Exam   Light touch normal.     Gait, Coordination, and Reflexes     Gait  Gait: normal    Reflexes   Right brachioradialis: 2+  Left brachioradialis: 2+  Right biceps: 2+  Left biceps: 2+  Right triceps: 2+  Left triceps: 2+  Right patellar: 2+  Left patellar: 2+  Right achilles: 2+  Left achilles: 2+  Right : 2+  Left : 2+  Right Monroy: absent  Left Monroy: absent  Right ankle clonus: absent  Left ankle clonus: absent      Imaging review: AP, lateral, spot and oblique films of the lumbar spine were reviewed and demonstrate moderate disc degeneration L2-5.  There is very minimal retrolisthesis L4 over 5.  I do not see any anterior listhesis or acute findings.    Assessment/Plan: Sarika has some disc degeneration of the lumbar spine and persistent radicular symptoms affecting the right leg.  She is neurologically stable.  She has failed conservative treatment as outlined above.  I therefore recommend MRI of the lumbar spine.  She is claustrophobic so we will premedicate for the MRI with Valium.  She understands she will need a ride.    To assist with pain, I refilled meloxicam and will add a trial of gabapentin 100 mg tablet, 1-3 every evening to see if this helps with her radicular pain.    We discussed avoidance of aggravating activities as well as protective body mechanics.    She will follow-up after the MRI is completed with me.    I advised the patient to call and return sooner for new or worsening complaints of weakness, paresthesias, gait disturbances, or any additional concerns.  Treatment options discussed in detail with Krystal and the patient voiced understanding.  Ms. Farias agrees with this plan of care.     Patient is a nonsmoker    The patient's Body mass index is 29.8 kg/m².. BMI is above normal parameters. Recommendations include: educational material    Diagnoses and all orders for this visit:    1. DDD (degenerative disc disease), lumbar (Primary)  -     MRI  Lumbar Spine Without Contrast; Future    2. Lumbar radiculopathy  -     meloxicam (Mobic) 15 MG tablet; Take 1 tablet by mouth Daily.  Dispense: 30 tablet; Refill: 2  -     gabapentin (NEURONTIN) 100 MG capsule; Take 1-3 tablets at bedtime  Dispense: 90 capsule; Refill: 1  -     diazePAM (VALIUM) 5 MG tablet; Take 1 tablet 45 minutes before test and 2nd tablet right before going into scanner  Dispense: 2 tablet; Refill: 0  -     MRI Lumbar Spine Without Contrast; Future    3. Nonsmoker    4. BMI 29.0-29.9,adult          I discussed the patients findings and my recommendations with patient    Reanna Venegas PA-C  11/22/23  08:48 CST

## 2024-01-10 ENCOUNTER — HOSPITAL ENCOUNTER (OUTPATIENT)
Dept: MRI IMAGING | Facility: HOSPITAL | Age: 49
Discharge: HOME | End: 2024-01-10
Admitting: PHYSICIAN ASSISTANT
Payer: COMMERCIAL

## 2024-01-10 DIAGNOSIS — M51.36 DDD (DEGENERATIVE DISC DISEASE), LUMBAR: ICD-10-CM

## 2024-01-10 DIAGNOSIS — M54.16 LUMBAR RADICULOPATHY: ICD-10-CM

## 2024-01-10 PROCEDURE — 72148 MRI LUMBAR SPINE W/O DYE: CPT

## 2024-01-15 ENCOUNTER — OFFICE VISIT (OUTPATIENT)
Dept: NEUROSURGERY | Facility: CLINIC | Age: 49
End: 2024-01-15
Payer: COMMERCIAL

## 2024-01-15 VITALS — WEIGHT: 188 LBS | BODY MASS INDEX: 30.22 KG/M2 | HEIGHT: 66 IN

## 2024-01-15 DIAGNOSIS — E66.09 CLASS 1 OBESITY DUE TO EXCESS CALORIES WITHOUT SERIOUS COMORBIDITY WITH BODY MASS INDEX (BMI) OF 30.0 TO 30.9 IN ADULT: ICD-10-CM

## 2024-01-15 DIAGNOSIS — M51.26 LUMBAR DISC HERNIATION: Primary | ICD-10-CM

## 2024-01-15 DIAGNOSIS — M46.1 SACROILIITIS: ICD-10-CM

## 2024-01-15 DIAGNOSIS — Z78.9 NONSMOKER: ICD-10-CM

## 2024-01-15 PROCEDURE — 99214 OFFICE O/P EST MOD 30 MIN: CPT | Performed by: PHYSICIAN ASSISTANT

## 2024-01-15 NOTE — PROGRESS NOTES
"    Chief complaint:   Chief Complaint   Patient presents with    Follow-up     PATIENT REPORTS TO OFFICE 6 WEEK FOLLOW UP MRI PRIOR         Subjective     HPI: Krystal comes in today with continued complaints of right-sided low back pain with radiation into the buttock and down the posterior thigh.  She also has some burning in her posterior heel.  She rates her pain a 4.  She does pretty well during the day and notices most of her pain in the evening after she has been on her feet all day.  She is also having quite a bit of pain with prolonged sitting and getting up and down out of a chair.  She denies any focal weakness and paresthesias.  She is no longer seeing the chiropractor.  She very sparingly takes gabapentin and has stopped meloxicam.    Review of Systems      Objective      Vital Signs  Ht 167.6 cm (65.98\")   Wt 85.3 kg (188 lb)   BMI 30.36 kg/m²     Physical Exam  Constitutional:       Appearance: Normal appearance. She is well-developed.   HENT:      Head: Normocephalic.   Eyes:      Pupils: Pupils are equal, round, and reactive to light.   Cardiovascular:      Rate and Rhythm: Normal rate and regular rhythm.   Pulmonary:      Effort: Pulmonary effort is normal.   Musculoskeletal:         General: Tenderness (right si joint) present. Normal range of motion.      Cervical back: Normal range of motion.   Skin:     General: Skin is warm.   Neurological:      Mental Status: She is alert and oriented to person, place, and time.      GCS: GCS eye subscore is 4. GCS verbal subscore is 5. GCS motor subscore is 6.      Cranial Nerves: Cranial nerves 2-12 are intact. No cranial nerve deficit.      Sensory: No sensory deficit.      Motor: No weakness.      Gait: Gait is intact. Gait normal.      Deep Tendon Reflexes: Reflexes are normal and symmetric.   Psychiatric:         Speech: Speech normal.         Behavior: Behavior normal.         Thought Content: Thought content normal.         Neurologic Exam "     Mental Status   Oriented to person, place, and time.   Speech: speech is normal   Knowledge: good.     Cranial Nerves   Cranial nerves II through XII intact.     CN III, IV, VI   Pupils are equal, round, and reactive to light.    Motor Exam   Muscle bulk: normal  Overall muscle tone: normal    Strength   Right iliopsoas: 5/5  Left iliopsoas: 5/5  Right quadriceps: 5/5  Left quadriceps: 5/5  Right hamstrin/5  Left hamstrin/5  Right glutei: 5/5  Left glutei: 5/5  Right anterior tibial: 5/5  Left anterior tibial: 5/5  Right posterior tibial: 5/5  Left posterior tibial: 5/5  Right gastroc: 5/5  Left gastroc: 5/5    Sensory Exam   Light touch normal.     Gait, Coordination, and Reflexes     Gait  Gait: normal      Imaging review: MRI of the lumbar spine was reviewed and demonstrates right eccentric disc herniation covering the lateral recess on the right at L5-S1.  There is moderate central stenosis.  There is facet and ligament hypertrophy at L4-5 with bilateral lateral recess stenosis.  Incidental finding of right renal nodule, 8 mm.        Assessment/Plan: Sarika has a 5-month history of right-sided low back pain with radicular symptoms.  She has a disc herniation covering lateral recess on the right at L5-S1.  She also has some SI joint dysfunction.  She is neurologically stable.    We discussed treatment options including lumbar physical therapy and SI joint physical therapy to include lumbar traction as well as epidural steroid injections.    She is hesitant to pursue injections but is agreeable to a trial of physical therapy.    I recommend she continue gabapentin as current.  She is not comfortable taking meloxicam due to renal effects.  I recommend she avoid any heavy lifting and excessive bending and twisting.    I will have her follow-up with Dr. Rogers in 6 to 8 weeks, sooner should she develop any worsening in pain, focal weakness or other issues or concerns.    Recommend she see her primary for  further workup regarding incidental finding of right renal lesion.      Patient is a nonsmoker    The patient's Body mass index is 30.36 kg/m².. BMI is above normal parameters. Recommendations include: educational material    Diagnoses and all orders for this visit:    1. Lumbar disc herniation (Primary)  -     Ambulatory Referral to Physical Therapy Evaluate and treat    2. Sacroiliitis  -     Ambulatory Referral to Physical Therapy Evaluate and treat    3. Nonsmoker    4. Class 1 obesity due to excess calories without serious comorbidity with body mass index (BMI) of 30.0 to 30.9 in adult        I discussed the patients findings and my recommendations with patient  Reanna Venegas PA-C  01/15/24  14:43 CST

## 2024-01-15 NOTE — PATIENT INSTRUCTIONS
See PCP regarding evaluation of kidney lesion  Continue Gabapentin, 1-3 tablets every evening as needed  No heavy lifting. Avoid bending and twisting.  Call for sooner appointment if your symptoms worsen

## 2024-01-18 ENCOUNTER — OFFICE VISIT (OUTPATIENT)
Dept: FAMILY MEDICINE CLINIC | Facility: CLINIC | Age: 49
End: 2024-01-18
Payer: COMMERCIAL

## 2024-01-18 VITALS
HEART RATE: 80 BPM | RESPIRATION RATE: 18 BRPM | DIASTOLIC BLOOD PRESSURE: 76 MMHG | HEIGHT: 66 IN | SYSTOLIC BLOOD PRESSURE: 140 MMHG | BODY MASS INDEX: 30.05 KG/M2 | WEIGHT: 187 LBS | OXYGEN SATURATION: 99 % | TEMPERATURE: 98.5 F

## 2024-01-18 DIAGNOSIS — N28.9 KIDNEY LESION: Primary | ICD-10-CM

## 2024-01-18 NOTE — PROGRESS NOTES
"Mayco Farias is a 48 y.o. female.     Chief Complaint   Patient presents with    Follow-up     mri        History of Present Illness     She was found to have  small lesion in her right kidney on mri ---no hematuia      Current Outpatient Medications:     cetirizine (zyrTEC) 10 MG tablet, Take 1 tablet by mouth Daily., Disp: , Rfl:     gabapentin (NEURONTIN) 100 MG capsule, Take 1-3 tablets at bedtime, Disp: 90 capsule, Rfl: 1  Allergies   Allergen Reactions    Penicillins Unknown - Low Severity     Not sure if allergic to PCN, Mother reported from childhood.              Facility age limit for growth %sharon is 20 years.    Past Medical History:   Diagnosis Date    Arthritis On x ray 2 years ago    Cholelithiasis 2015    s/p GB surgery    Lumbar radiculopathy 11/22/2023     Past Surgical History:   Procedure Laterality Date    CHOLECYSTECTOMY  2015    HYSTERECTOMY  2015       Review of Systems   Constitutional: Negative.    HENT: Negative.     Eyes: Negative.    Respiratory: Negative.     Cardiovascular: Negative.    Gastrointestinal: Negative.    Endocrine: Negative.    Genitourinary: Negative.    Musculoskeletal:  Positive for back pain.   Allergic/Immunologic: Negative.    Hematological: Negative.    Psychiatric/Behavioral: Negative.         Objective /76   Pulse 80   Temp 98.5 °F (36.9 °C)   Resp 18   Ht 167.6 cm (65.98\")   Wt 84.8 kg (187 lb)   SpO2 99%   BMI 30.20 kg/m²    Physical Exam  Vitals and nursing note reviewed.   Constitutional:       Appearance: Normal appearance.   HENT:      Head: Normocephalic and atraumatic.      Nose: Nose normal.   Eyes:      Pupils: Pupils are equal, round, and reactive to light.   Cardiovascular:      Rate and Rhythm: Normal rate.      Pulses: Normal pulses.   Pulmonary:      Effort: Pulmonary effort is normal.   Abdominal:      General: Abdomen is flat.   Musculoskeletal:         General: Normal range of motion.      Cervical back: Normal range " of motion and neck supple.   Skin:     General: Skin is warm.      Capillary Refill: Capillary refill takes less than 2 seconds.   Neurological:      General: No focal deficit present.      Mental Status: She is alert.   Psychiatric:         Mood and Affect: Mood normal.         Assessment & Plan   Diagnoses and all orders for this visit:    1. Kidney lesion (Primary)  -     US Renal Bilateral                 Orders Placed This Encounter   Procedures    US Renal Bilateral     Order Specific Question:   Reason for Exam:     Answer:   lesion on right kidney  on mri     Order Specific Question:   Release to patient     Answer:   Routine Release [[id number]]       Follow up: 6 month(s)

## 2024-01-25 ENCOUNTER — HOSPITAL ENCOUNTER (OUTPATIENT)
Dept: ULTRASOUND IMAGING | Facility: HOSPITAL | Age: 49
Discharge: HOME | End: 2024-01-25
Admitting: FAMILY MEDICINE
Payer: COMMERCIAL

## 2024-01-25 PROCEDURE — 76775 US EXAM ABDO BACK WALL LIM: CPT

## 2024-01-26 DIAGNOSIS — N28.89 RENAL MASS, RIGHT: Primary | ICD-10-CM

## 2024-02-01 NOTE — PROGRESS NOTES
"Chief Complaint  Renal Mass    Subjective          Krystal Farias presents to [facility] UROLOG[facility]   Patient was identified as having a small subcentimeter lesion in the posterior cortex of the right kidney on an MRI that was done to image her spine.  She denies any hematuria or flank pain.      Current Outpatient Medications:     cetirizine (zyrTEC) 10 MG tablet, Take 1 tablet by mouth Daily., Disp: , Rfl:     gabapentin (NEURONTIN) 100 MG capsule, Take 1-3 tablets at bedtime, Disp: 90 capsule, Rfl: 1  Past Medical History:   Diagnosis Date    Arthritis On x ray 2 years ago    Cholelithiasis 2015    s/p GB surgery    Lumbar radiculopathy 11/22/2023     Past Surgical History:   Procedure Laterality Date    CHOLECYSTECTOMY  2015    HYSTERECTOMY  2015           Review of Systems      Objective   PHYSICAL EXAM  Vital Signs:   Temp 97.6 °F (36.4 °C)   Ht 167.6 cm (66\")   Wt 85.5 kg (188 lb 9.6 oz)   BMI 30.44 kg/m²     Physical Exam      DATA  Result Review :              Results for orders placed or performed in visit on 02/05/24   POC Urinalysis Dipstick, Multipro    Specimen: Urine   Result Value Ref Range    Color Yellow Yellow, Straw, Dark Yellow, Ana    Clarity, UA Clear Clear    Glucose, UA Negative Negative mg/dL    Bilirubin Negative Negative    Ketones, UA Negative Negative    Specific Gravity  1.015 1.005 - 1.030    Blood, UA Negative Negative    pH, Urine 7.0 5.0 - 8.0    Protein, POC Negative Negative mg/dL    Urobilinogen, UA 0.2 E.U./dL Normal, 0.2 E.U./dL    Nitrite, UA Negative Negative    Leukocytes Negative Negative        MRI Lumbar Spine Without Contrast (01/10/2024 09:31)   US Renal Bilateral (01/25/2024 07:38)    The images for the above \"link(s)\" were made available to me to review independently.  I also reviewed the radiologist's report described above with regard to the urologic findings. My interpretation is as follows:  This lesion is small.  It is posterior and " interpolar.  It measures 8 mm I am not surprised that we cannot see it on a renal ultrasound.  /Get Henderson MD           ASSESSMENT AND PLAN          Problem List Items Addressed This Visit    None  Visit Diagnoses       Right renal mass    -  Primary    Relevant Orders    POC Urinalysis Dipstick, Multipro (Completed)    CT Abdomen Kidney With & Without Contrast        This represents an undiagnosed new problem with uncertain prognosis based on the inability to discern cystic versus solid nature so I think a CT renal mass protocol is necessary.  I reassured her that the mass is so small that we may not be able to distinguish that even with a CT renal mass protocol.  It would give us a baseline to continue to follow this..          FOLLOW UP     Return in about 2 weeks (around 2/19/2024) for ct renal mass protocol before next visit.        (Please note that portions of this note were completed with a voice recognition program.)  Get Henderson MD  02/05/24  13:43 CST

## 2024-02-05 ENCOUNTER — OFFICE VISIT (OUTPATIENT)
Dept: UROLOGY | Facility: CLINIC | Age: 49
End: 2024-02-05
Payer: COMMERCIAL

## 2024-02-05 VITALS — TEMPERATURE: 97.6 F | WEIGHT: 188.6 LBS | HEIGHT: 66 IN | BODY MASS INDEX: 30.31 KG/M2

## 2024-02-05 DIAGNOSIS — N28.89 RIGHT RENAL MASS: Primary | ICD-10-CM

## 2024-02-05 LAB
BILIRUB BLD-MCNC: NEGATIVE MG/DL
CLARITY, POC: CLEAR
COLOR UR: YELLOW
GLUCOSE UR STRIP-MCNC: NEGATIVE MG/DL
KETONES UR QL: NEGATIVE
LEUKOCYTE EST, POC: NEGATIVE
NITRITE UR-MCNC: NEGATIVE MG/ML
PH UR: 7 [PH] (ref 5–8)
PROT UR STRIP-MCNC: NEGATIVE MG/DL
RBC # UR STRIP: NEGATIVE /UL
SP GR UR: 1.01 (ref 1–1.03)
UROBILINOGEN UR QL: NORMAL

## 2024-02-05 PROCEDURE — 81003 URINALYSIS AUTO W/O SCOPE: CPT | Performed by: UROLOGY

## 2024-02-05 PROCEDURE — 99204 OFFICE O/P NEW MOD 45 MIN: CPT | Performed by: UROLOGY

## 2024-02-08 NOTE — PROGRESS NOTES
Chief Complaint  Right Renal Mass    Subjective          Krystal Farias presents to [facility] UROLOG[facility]   Patient returns today with MRI for right renal mass.      Current Outpatient Medications:     cetirizine (zyrTEC) 10 MG tablet, Take 1 tablet by mouth Daily., Disp: , Rfl:     gabapentin (NEURONTIN) 100 MG capsule, Take 1-3 tablets at bedtime, Disp: 90 capsule, Rfl: 1  Past Medical History:   Diagnosis Date    Arthritis On x ray 2 years ago    Cholelithiasis 2015    s/p GB surgery    Lumbar radiculopathy 11/22/2023     Past Surgical History:   Procedure Laterality Date    CHOLECYSTECTOMY  2015    HYSTERECTOMY  2015           Review of Systems      Objective   PHYSICAL EXAM  Vital Signs:   There were no vitals taken for this visit.    Physical Exam      DATA  Result Review :              Results for orders placed or performed during the hospital encounter of 02/15/24   POC Creatinine    Specimen: Blood   Result Value Ref Range    Creatinine 0.70 0.60 - 1.30 mg/dL     CT Abdomen Kidney With & Without Contrast (02/15/2024 09:18)   I looked at these images.  This lesion is a simple renal cyst with no evidence of enhancement.  There are no other concerning lesions.       ASSESSMENT AND PLAN          Problem List Items Addressed This Visit    None  Visit Diagnoses       Acquired renal cyst of right kidney    -  Primary        This is a benign lesion.  No further follow-up is needed.          FOLLOW UP     Return if symptoms worsen or fail to improve.        (Please note that portions of this note were completed with a voice recognition program.)  Get Henderson MD  02/20/24  14:14 CST

## 2024-02-15 ENCOUNTER — HOSPITAL ENCOUNTER (OUTPATIENT)
Dept: CT IMAGING | Facility: HOSPITAL | Age: 49
Discharge: HOME | End: 2024-02-15
Admitting: UROLOGY
Payer: COMMERCIAL

## 2024-02-15 DIAGNOSIS — N28.89 RIGHT RENAL MASS: ICD-10-CM

## 2024-02-15 PROCEDURE — 74170 CT ABD WO CNTRST FLWD CNTRST: CPT

## 2024-02-15 PROCEDURE — 25510000001 IOPAMIDOL 61 % SOLUTION: Performed by: UROLOGY

## 2024-02-15 RX ADMIN — IOPAMIDOL 100 ML: 612 INJECTION, SOLUTION INTRAVENOUS at 09:18

## 2024-02-19 LAB — CREAT BLDA-MCNC: 0.7 MG/DL (ref 0.6–1.3)

## 2024-02-20 ENCOUNTER — OFFICE VISIT (OUTPATIENT)
Dept: UROLOGY | Facility: CLINIC | Age: 49
End: 2024-02-20
Payer: COMMERCIAL

## 2024-02-20 DIAGNOSIS — N28.1 ACQUIRED RENAL CYST OF RIGHT KIDNEY: Primary | ICD-10-CM

## 2024-02-20 PROCEDURE — 99213 OFFICE O/P EST LOW 20 MIN: CPT | Performed by: UROLOGY

## 2024-04-24 ENCOUNTER — OFFICE VISIT (OUTPATIENT)
Dept: FAMILY MEDICINE CLINIC | Facility: CLINIC | Age: 49
End: 2024-04-24
Payer: COMMERCIAL

## 2024-04-24 VITALS
DIASTOLIC BLOOD PRESSURE: 90 MMHG | RESPIRATION RATE: 18 BRPM | HEIGHT: 66 IN | WEIGHT: 188.2 LBS | SYSTOLIC BLOOD PRESSURE: 120 MMHG | OXYGEN SATURATION: 99 % | BODY MASS INDEX: 30.25 KG/M2 | TEMPERATURE: 97.1 F | HEART RATE: 79 BPM

## 2024-04-24 DIAGNOSIS — S05.02XA ABRASION OF LEFT CORNEA, INITIAL ENCOUNTER: Primary | ICD-10-CM

## 2024-04-24 RX ORDER — CIPROFLOXACIN HYDROCHLORIDE 3.5 MG/ML
1 SOLUTION/ DROPS TOPICAL 4 TIMES DAILY
Qty: 2.5 ML | Refills: 0 | Status: SHIPPED | OUTPATIENT
Start: 2024-04-24

## 2024-04-25 NOTE — PROGRESS NOTES
"Subjective   Chief Complaint:  Possible pinkeye    History of Present Illness  The patient presents today for possible pinkeye in the left eye.    The patient reported awakening this morning with erythema in her left eye. She typically utilizes contact lenses and denies any symptoms of pharyngitis, otalgia, or pyrexia. She is employed at a .    Past Medical, Surgical, Social, and Family History:  Allergies   Allergen Reactions    Penicillins Unknown - Low Severity     Not sure if allergic to PCN, Mother reported from childhood.      Current Outpatient Medications on File Prior to Visit   Medication Sig    cetirizine (zyrTEC) 10 MG tablet Take 1 tablet by mouth Daily.    gabapentin (NEURONTIN) 100 MG capsule Take 1-3 tablets at bedtime (Patient taking differently: As Needed. Take 1-3 tablets at bedtime)     No current facility-administered medications on file prior to visit.      Past Medical History:   Diagnosis Date    Arthritis On x ray 2 years ago    Cholelithiasis 2015    s/p GB surgery    Lumbar radiculopathy 11/22/2023     Past Surgical History:   Procedure Laterality Date    CHOLECYSTECTOMY  2015    HYSTERECTOMY  2015     Social History     Socioeconomic History    Marital status:    Tobacco Use    Smoking status: Never    Smokeless tobacco: Former   Substance and Sexual Activity    Alcohol use: Never    Drug use: Never    Sexual activity: Yes     Partners: Male     Birth control/protection: None, Hysterectomy     Family History   Problem Relation Age of Onset    Hypertension Mother     Diabetes Father     No Known Problems Sister        Prior Visit Notes/Records, Lab, Imaging, and Diagnostic Results Reviewed:  A1C:No results for input(s): \"HGBA1C\" in the last 47041 hours.  GLUCOSE:No results for input(s): \"GLUCOSE\" in the last 05629 hours.  LIPID:No results for input(s): \"CHLPL\", \"LDL\", \"HDL\", \"TRIG\" in the last 11944 hours.  PSA:No results for input(s): \"PSA\" in the last 31296 hours.  CBC:No " "results for input(s): \"WBC\", \"HGB\", \"HCT\", \"PLT\", \"IRONSERUM\", \"IRON\" in the last 65154 hours.   BMP/CMP:  Lab Results - Last 18 Months   Lab Units 02/15/24  0915   CREATININE mg/dL 0.70     HEPATIC:No results for input(s): \"ALT\", \"AST\", \"ALKPHOS\", \"TOTAL\" in the last 51115 hours.    Invalid input(s): \"HEPATITSC\"  Vit D:No results for input(s): \"LYLF13PI\" in the last 90565 hours.  THYROID:No results for input(s): \"TSH\", \"FREET4\", \"FTI\" in the last 93140 hours.    Invalid input(s): \"FREET3\", \"T3\", \"T4\", \"TEUP\", \"TOTALT4\"  BMI Trend:  BMI Readings from Last 10 Encounters:   04/24/24 30.38 kg/m²   02/05/24 30.44 kg/m²   01/18/24 30.20 kg/m²   01/15/24 30.36 kg/m²   11/22/23 29.80 kg/m²   10/26/23 28.82 kg/m²   02/02/23 28.04 kg/m²   04/18/22 28.35 kg/m²   04/16/22 28.35 kg/m²   01/04/22 25.66 kg/m²     Objective   Vital Signs  /90 (BP Location: Left arm, Patient Position: Sitting, Cuff Size: Large Adult)   Pulse 79   Temp 97.1 °F (36.2 °C) (Infrared)   Resp 18   Ht 167.6 cm (66\")   Wt 85.4 kg (188 lb 3.2 oz)   SpO2 99%   BMI 30.38 kg/m²   Physical Exam  Vitals reviewed.   Constitutional:       General: She is not in acute distress.     Appearance: Normal appearance.   Eyes:      Comments: Left cornea abraded   Cardiovascular:      Rate and Rhythm: Normal rate and regular rhythm.   Pulmonary:      Effort: Pulmonary effort is normal.      Breath sounds: Normal breath sounds.       Assessment & Plan   Diagnoses and all orders for this visit:    1. Abrasion of left cornea, initial encounter (Primary)    Other orders  -     ciprofloxacin (Ciloxan) 0.3 % ophthalmic solution; Administer 1 drop to both eyes 4 (Four) Times a Day.  Dispense: 2.5 mL; Refill: 0    Discussions & Anticipatory Guidance:  Advised and educated plan of care.      The patient will Return for follow-up as needed.      CHRIS Co- used for dictation.    Electronically signed by TERESA Sin, 04/25/24, 1:02 PM CDT.  "

## 2024-06-27 ENCOUNTER — TELEPHONE (OUTPATIENT)
Dept: FAMILY MEDICINE CLINIC | Facility: CLINIC | Age: 49
End: 2024-06-27

## 2024-06-27 NOTE — TELEPHONE ENCOUNTER
Caller: Krystal Farias    Relationship: Self    Best call back number:  [phone]     What is the best time to reach you:                What was the call regarding: GOT A BITE 2 DAYS AGO.  SHE IS NOT HAVING SOB, NOR HAVING AN ALLERGIC REACTION.  LOCATION HAS GOTTEN BIGGER AND IS RED.  PLEASE CALL BACK WITH ADVISEMENT.  UNABLE TO WARM TRANSFER.

## 2024-12-05 ENCOUNTER — TELEPHONE (OUTPATIENT)
Age: 49
End: 2024-12-05

## 2024-12-05 NOTE — TELEPHONE ENCOUNTER
Called patient to schedule work comp approved surgery. She is at work and has requested I call her back around 3:00. I have also sent her a MyChart signup request to her cell.

## 2024-12-05 NOTE — TELEPHONE ENCOUNTER
Spoke with patient and picked a date for approved work comp surgery. 1/28/25 @ [facility]. Informed patient PreOp department would call for any labwork needed and he would be called day before surgery for confirmation of arrival time. Patient voiced understanding.

## 2025-01-14 DIAGNOSIS — M77.11 RIGHT LATERAL EPICONDYLITIS: Primary | ICD-10-CM

## 2025-01-28 DIAGNOSIS — M77.11 LATERAL EPICONDYLITIS, RIGHT ELBOW: Primary | ICD-10-CM

## 2025-01-28 RX ORDER — ONDANSETRON 4 MG/1
4 TABLET, FILM COATED ORAL EVERY 8 HOURS PRN
Qty: 10 TABLET | Refills: 0 | Status: SHIPPED | OUTPATIENT
Start: 2025-01-28

## 2025-01-28 RX ORDER — HYDROCODONE BITARTRATE AND ACETAMINOPHEN 7.5; 325 MG/1; MG/1
1 TABLET ORAL EVERY 6 HOURS PRN
Qty: 20 TABLET | Refills: 0 | Status: SHIPPED | OUTPATIENT
Start: 2025-01-28 | End: 2025-02-02

## 2025-02-04 PROBLEM — M77.11 RIGHT LATERAL EPICONDYLITIS: Status: ACTIVE | Noted: 2025-02-04

## 2025-02-04 PROBLEM — Z47.89 AFTERCARE FOLLOWING SURGERY OF THE MUSCULOSKELETAL SYSTEM: Status: ACTIVE | Noted: 2025-02-04

## 2025-02-05 ENCOUNTER — OFFICE VISIT (OUTPATIENT)
Age: 50
End: 2025-02-05

## 2025-02-05 VITALS — WEIGHT: 186 LBS | HEIGHT: 67 IN | BODY MASS INDEX: 29.19 KG/M2

## 2025-02-05 DIAGNOSIS — M77.11 RIGHT LATERAL EPICONDYLITIS: ICD-10-CM

## 2025-02-05 DIAGNOSIS — Z47.89 AFTERCARE FOLLOWING SURGERY OF THE MUSCULOSKELETAL SYSTEM: Primary | ICD-10-CM

## 2025-02-05 RX ORDER — CETIRIZINE HYDROCHLORIDE 10 MG/1
10 TABLET ORAL DAILY
COMMUNITY

## 2025-02-05 NOTE — PROGRESS NOTES
Orthopaedic Clinic Note    NAME:  Glenda Joseph   : 1975  MRN: [id number]      2025     CHIEF COMPLAINT: Status post right elbow partial lateral epicondylectomy, common extensor tendon repair, 25    HISTORY OF PRESENT ILLNESS:   Glenda returns today for follow up of the right elbow.  Pain is controlled. There have been no postoperative complications to date.     Past Medical History:    No past medical history on file.    Past Surgical History:        Procedure Laterality Date    ELBOW SURGERY  25       Current Medications:   Prior to Admission medications    Medication Sig Start Date End Date Taking? Authorizing Provider   cetirizine (ZYRTEC) 10 MG tablet Take 1 tablet by mouth daily    Provider, MD Winnie   ondansetron (ZOFRAN) 4 MG tablet Take 1 tablet by mouth every 8 hours as needed for Nausea or Vomiting 25   Rusty Tijerina MD       Allergies:  Patient has no known allergies.    PHYSICAL EXAM:    Right elbow  Incision clean, dry, and intact.  No signs of infection. Neurovascularly intact.  Motion: 10 to 90 degrees flexion of the elbow  Strength: Deferred      Assessment:   Encounter Diagnoses   Name Primary?    Aftercare following surgery of the musculoskeletal system Yes    Right lateral epicondylitis         Plan:  -Ice, wean from pain medication.  -Will place in a removable wrist splint to protect the repair, begin ROM as tolerated of elbow  - Start therapy after next visit.    -The patient is doing well postop, will continue the current postop treatment plan, and return to clinic for a repeat evaluation.    -Off work until next visit.    Return in about 4 weeks (around 3/5/2025) for Work comp recheck right elbow, no x-ray.    Electronically signed by Rusty Tijerina MD on 2025 at 2:37 PM.

## 2025-03-04 NOTE — PROGRESS NOTES
Orthopaedic Clinic Note    NAME:  Glenda Joseph   : 1975  MRN: [id number]      3/5/2025     CHIEF COMPLAINT: Status post right elbow partial lateral epicondylectomy, common extensor tendon repair, 25    HISTORY OF PRESENT ILLNESS:   Glenda returns today for follow up of the right elbow.  Pain is controlled. There have been no postoperative complications to date.  She has been wearing her removable wrist splint as instructed and working on elbow range of motion.    Past Medical History:    History reviewed. No pertinent past medical history.    Past Surgical History:        Procedure Laterality Date    ELBOW SURGERY  25       Current Medications:   Prior to Admission medications    Medication Sig Start Date End Date Taking? Authorizing Provider   cetirizine (ZYRTEC) 10 MG tablet Take 1 tablet by mouth daily   Yes Provider, MD Winnie   ondansetron (ZOFRAN) 4 MG tablet Take 1 tablet by mouth every 8 hours as needed for Nausea or Vomiting  Patient not taking: Reported on 3/5/2025 1/28/25   Rusty Tijerina MD       Allergies:  Patient has no known allergies.    PHYSICAL EXAM:    Right elbow  Incision clean, dry, and intact.  No signs of infection. Neurovascularly intact.  Motion: 3 to 90 degrees flexion of the elbow  Strength: Deferred      Assessment:   Encounter Diagnoses   Name Primary?    Aftercare following surgery of the musculoskeletal system Yes    Right lateral epicondylitis           Plan:  - Start therapy for strengthening of the common extensor tendon and wrist dorsiflexors    -The patient is doing well postop, will continue the current postop treatment plan, and return to clinic for a repeat evaluation.    She may return to work light duty with no use of the right upper extremity.    Return in about 6 weeks (around 2025) for Work comp recheck right elbow, no x-ray.    Electronically signed by Rusty Tijernia MD on 3/5/2025 at 9:17 AM.

## 2025-03-05 ENCOUNTER — OFFICE VISIT (OUTPATIENT)
Age: 50
End: 2025-03-05

## 2025-03-05 VITALS — WEIGHT: 186 LBS | HEIGHT: 67 IN | BODY MASS INDEX: 29.19 KG/M2

## 2025-03-05 DIAGNOSIS — Z47.89 AFTERCARE FOLLOWING SURGERY OF THE MUSCULOSKELETAL SYSTEM: Primary | ICD-10-CM

## 2025-03-05 DIAGNOSIS — M77.11 RIGHT LATERAL EPICONDYLITIS: ICD-10-CM

## 2025-03-17 ENCOUNTER — TELEPHONE (OUTPATIENT)
Age: 50
End: 2025-03-17

## 2025-04-15 NOTE — PROGRESS NOTES
Orthopaedic Clinic Note    NAME:  Glenda Joseph   : 1975  MRN: [id number]      2025     CHIEF COMPLAINT: Status post right elbow partial lateral epicondylectomy, common extensor tendon repair, 25    HISTORY OF PRESENT ILLNESS:   Glenda returns today for follow up of the right elbow.  Pain is controlled. There have been no postoperative complications to date.  She completed physical therapy at our facility.  She has been released from their care.  She is ready to return to work.    Past Medical History:    History reviewed. No pertinent past medical history.    Past Surgical History:        Procedure Laterality Date    ELBOW SURGERY  25       Current Medications:   Prior to Admission medications    Medication Sig Start Date End Date Taking? Authorizing Provider   cetirizine (ZYRTEC) 10 MG tablet Take 1 tablet by mouth daily   Yes Provider, MD Winnie   ondansetron (ZOFRAN) 4 MG tablet Take 1 tablet by mouth every 8 hours as needed for Nausea or Vomiting  Patient not taking: Reported on 2025   Rusty Tijerina MD       Allergies:  Patient has no known allergies.    PHYSICAL EXAM:    Right elbow  Incision clean, dry, and intact.  No signs of infection. Neurovascularly intact.  Motion: 0 to 120 degrees flexion of the elbow  Strength: 4+/5      Assessment:   Encounter Diagnoses   Name Primary?    Aftercare following surgery of the musculoskeletal system Yes    Right lateral epicondylitis        Plan:  - Continue home exercise program for strengthening    -The patient is doing well postop, will continue the current postop treatment plan, and return to clinic for a repeat evaluation.    She may return to work full duty without restriction.  She will be considered at [facility] today.    Return if symptoms worsen or fail to improve.    Electronically signed by Rusty Tijerina MD on 2025 at 9:39 AM.

## 2025-04-16 ENCOUNTER — OFFICE VISIT (OUTPATIENT)
Age: 50
End: 2025-04-16

## 2025-04-16 VITALS — WEIGHT: 185.8 LBS | BODY MASS INDEX: 29.16 KG/M2 | HEIGHT: 67 IN

## 2025-04-16 DIAGNOSIS — Z47.89 AFTERCARE FOLLOWING SURGERY OF THE MUSCULOSKELETAL SYSTEM: Primary | ICD-10-CM

## 2025-04-16 DIAGNOSIS — M77.11 RIGHT LATERAL EPICONDYLITIS: ICD-10-CM
